# Patient Record
Sex: MALE | Race: BLACK OR AFRICAN AMERICAN | NOT HISPANIC OR LATINO | Employment: FULL TIME | URBAN - METROPOLITAN AREA
[De-identification: names, ages, dates, MRNs, and addresses within clinical notes are randomized per-mention and may not be internally consistent; named-entity substitution may affect disease eponyms.]

---
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
## 2022-02-10 DIAGNOSIS — Z31.448 ENCOUNTER FOR OTHER GENETIC TESTING OF MALE FOR PROCREATIVE MANAGEMENT: ICD-10-CM

## 2022-02-10 DIAGNOSIS — Z83.2 FAMILY HISTORY OF BETA THALASSEMIA: Primary | ICD-10-CM

## 2022-02-10 NOTE — PROGRESS NOTES
Reports sister is a carrier for beta thalassemia  Patient's partner may have alpha thalassemia trait and is currently pregnant